# Patient Record
Sex: MALE | Race: WHITE | Employment: FULL TIME | URBAN - METROPOLITAN AREA
[De-identification: names, ages, dates, MRNs, and addresses within clinical notes are randomized per-mention and may not be internally consistent; named-entity substitution may affect disease eponyms.]

---

## 2023-02-16 ENCOUNTER — HOSPITAL ENCOUNTER (OUTPATIENT)
Facility: HOSPITAL | Age: 83
Discharge: HOME | End: 2023-02-16
Payer: MEDICARE

## 2023-02-16 DIAGNOSIS — M75.41 IMPINGEMENT SYNDROME OF RIGHT SHOULDER: ICD-10-CM

## 2023-02-16 LAB
ALBUMIN SERPL-MCNC: 3.5 G/DL (ref 3.4–5)
ALBUMIN/GLOB SERPL: 1.2 (ref 0.8–1.7)
ALP SERPL-CCNC: 92 U/L (ref 45–117)
ALT SERPL-CCNC: 19 U/L (ref 16–61)
ANION GAP SERPL CALC-SCNC: 4 MMOL/L (ref 3–18)
APPEARANCE UR: CLEAR
AST SERPL-CCNC: 13 U/L (ref 10–38)
BILIRUB SERPL-MCNC: 0.4 MG/DL (ref 0.2–1)
BILIRUB UR QL: NEGATIVE
BUN SERPL-MCNC: 19 MG/DL (ref 7–18)
BUN/CREAT SERPL: 23 (ref 12–20)
CALCIUM SERPL-MCNC: 9.6 MG/DL (ref 8.5–10.1)
CHLORIDE SERPL-SCNC: 105 MMOL/L (ref 100–111)
CO2 SERPL-SCNC: 31 MMOL/L (ref 21–32)
COLOR UR: YELLOW
CREAT SERPL-MCNC: 0.83 MG/DL (ref 0.6–1.3)
EKG ATRIAL RATE: 83 BPM
EKG DIAGNOSIS: NORMAL
EKG P AXIS: 70 DEGREES
EKG P-R INTERVAL: 194 MS
EKG Q-T INTERVAL: 390 MS
EKG QRS DURATION: 138 MS
EKG QTC CALCULATION (BAZETT): 458 MS
EKG R AXIS: 19 DEGREES
EKG T AXIS: 46 DEGREES
EKG VENTRICULAR RATE: 83 BPM
ERYTHROCYTE [DISTWIDTH] IN BLOOD BY AUTOMATED COUNT: 12.6 % (ref 11.6–14.5)
GLOBULIN SER CALC-MCNC: 3 G/DL (ref 2–4)
GLUCOSE SERPL-MCNC: 120 MG/DL (ref 74–99)
GLUCOSE UR STRIP.AUTO-MCNC: NEGATIVE MG/DL
HCT VFR BLD AUTO: 36.2 % (ref 36–48)
HGB BLD-MCNC: 12.3 G/DL (ref 13–16)
HGB UR QL STRIP: NEGATIVE
KETONES UR QL STRIP.AUTO: NEGATIVE MG/DL
LEUKOCYTE ESTERASE UR QL STRIP.AUTO: NEGATIVE
MCH RBC QN AUTO: 34.8 PG (ref 24–34)
MCHC RBC AUTO-ENTMCNC: 34 G/DL (ref 31–37)
MCV RBC AUTO: 102.5 FL (ref 78–100)
NITRITE UR QL STRIP.AUTO: NEGATIVE
NRBC # BLD: 0 K/UL (ref 0–0.01)
NRBC BLD-RTO: 0 PER 100 WBC
PH UR STRIP: 6.5 (ref 5–8)
PLATELET # BLD AUTO: 211 K/UL (ref 135–420)
PMV BLD AUTO: 8.9 FL (ref 9.2–11.8)
POTASSIUM SERPL-SCNC: 4.4 MMOL/L (ref 3.5–5.5)
PROT SERPL-MCNC: 6.5 G/DL (ref 6.4–8.2)
PROT UR STRIP-MCNC: NEGATIVE MG/DL
RBC # BLD AUTO: 3.53 M/UL (ref 4.35–5.65)
SODIUM SERPL-SCNC: 140 MMOL/L (ref 136–145)
SP GR UR REFRACTOMETRY: 1.02 (ref 1–1.03)
UROBILINOGEN UR QL STRIP.AUTO: 1 EU/DL (ref 0.2–1)
WBC # BLD AUTO: 8.4 K/UL (ref 4.6–13.2)

## 2023-02-16 PROCEDURE — 36415 COLL VENOUS BLD VENIPUNCTURE: CPT

## 2023-02-16 PROCEDURE — 81003 URINALYSIS AUTO W/O SCOPE: CPT

## 2023-02-16 PROCEDURE — 85027 COMPLETE CBC AUTOMATED: CPT

## 2023-02-16 PROCEDURE — 80053 COMPREHEN METABOLIC PANEL: CPT

## 2023-03-06 ENCOUNTER — ANESTHESIA EVENT (OUTPATIENT)
Facility: HOSPITAL | Age: 83
End: 2023-03-06
Payer: MEDICARE

## 2023-03-06 ENCOUNTER — HOSPITAL ENCOUNTER (OUTPATIENT)
Facility: HOSPITAL | Age: 83
Setting detail: OUTPATIENT SURGERY
Discharge: HOME | End: 2023-03-06
Attending: ORTHOPAEDIC SURGERY | Admitting: ORTHOPAEDIC SURGERY
Payer: MEDICARE

## 2023-03-06 ENCOUNTER — ANESTHESIA (OUTPATIENT)
Facility: HOSPITAL | Age: 83
End: 2023-03-06
Payer: MEDICARE

## 2023-03-06 VITALS
OXYGEN SATURATION: 96 % | SYSTOLIC BLOOD PRESSURE: 132 MMHG | HEART RATE: 77 BPM | HEIGHT: 73 IN | DIASTOLIC BLOOD PRESSURE: 58 MMHG | TEMPERATURE: 97.1 F | BODY MASS INDEX: 26.85 KG/M2 | RESPIRATION RATE: 16 BRPM | WEIGHT: 202.6 LBS

## 2023-03-06 DIAGNOSIS — M75.111 NONTRAUMATIC INCOMPLETE TEAR OF RIGHT ROTATOR CUFF: Chronic | ICD-10-CM

## 2023-03-06 DIAGNOSIS — M75.41 SHOULDER IMPINGEMENT SYNDROME, RIGHT: Primary | Chronic | ICD-10-CM

## 2023-03-06 DIAGNOSIS — M19.012 PRIMARY OSTEOARTHRITIS OF LEFT SHOULDER: Chronic | ICD-10-CM

## 2023-03-06 PROCEDURE — 3700000001 HC ADD 15 MINUTES (ANESTHESIA): Performed by: ORTHOPAEDIC SURGERY

## 2023-03-06 PROCEDURE — 6370000000 HC RX 637 (ALT 250 FOR IP): Performed by: SPECIALIST

## 2023-03-06 PROCEDURE — 6360000002 HC RX W HCPCS: Performed by: ORTHOPAEDIC SURGERY

## 2023-03-06 PROCEDURE — 7100000011 HC PHASE II RECOVERY - ADDTL 15 MIN: Performed by: ORTHOPAEDIC SURGERY

## 2023-03-06 PROCEDURE — 2500000003 HC RX 250 WO HCPCS: Performed by: ORTHOPAEDIC SURGERY

## 2023-03-06 PROCEDURE — 2500000003 HC RX 250 WO HCPCS: Performed by: NURSE ANESTHETIST, CERTIFIED REGISTERED

## 2023-03-06 PROCEDURE — 7100000001 HC PACU RECOVERY - ADDTL 15 MIN: Performed by: ORTHOPAEDIC SURGERY

## 2023-03-06 PROCEDURE — 2580000003 HC RX 258: Performed by: ORTHOPAEDIC SURGERY

## 2023-03-06 PROCEDURE — 3600000002 HC SURGERY LEVEL 2 BASE: Performed by: ORTHOPAEDIC SURGERY

## 2023-03-06 PROCEDURE — 7100000010 HC PHASE II RECOVERY - FIRST 15 MIN: Performed by: ORTHOPAEDIC SURGERY

## 2023-03-06 PROCEDURE — 2709999900 HC NON-CHARGEABLE SUPPLY: Performed by: ORTHOPAEDIC SURGERY

## 2023-03-06 PROCEDURE — 6360000002 HC RX W HCPCS: Performed by: NURSE ANESTHETIST, CERTIFIED REGISTERED

## 2023-03-06 PROCEDURE — C1713 ANCHOR/SCREW BN/BN,TIS/BN: HCPCS | Performed by: ORTHOPAEDIC SURGERY

## 2023-03-06 PROCEDURE — 3600000012 HC SURGERY LEVEL 2 ADDTL 15MIN: Performed by: ORTHOPAEDIC SURGERY

## 2023-03-06 PROCEDURE — 7100000000 HC PACU RECOVERY - FIRST 15 MIN: Performed by: ORTHOPAEDIC SURGERY

## 2023-03-06 PROCEDURE — 3700000000 HC ANESTHESIA ATTENDED CARE: Performed by: ORTHOPAEDIC SURGERY

## 2023-03-06 RX ORDER — FENTANYL CITRATE 50 UG/ML
25 INJECTION, SOLUTION INTRAMUSCULAR; INTRAVENOUS EVERY 5 MIN PRN
Status: DISCONTINUED | OUTPATIENT
Start: 2023-03-06 | End: 2023-03-06 | Stop reason: HOSPADM

## 2023-03-06 RX ORDER — MORPHINE SULFATE 4 MG/ML
INJECTION, SOLUTION INTRAMUSCULAR; INTRAVENOUS PRN
Status: DISCONTINUED | OUTPATIENT
Start: 2023-03-06 | End: 2023-03-06 | Stop reason: ALTCHOICE

## 2023-03-06 RX ORDER — TRAMADOL HYDROCHLORIDE 50 MG/1
50 TABLET ORAL EVERY 6 HOURS PRN
Status: DISCONTINUED | OUTPATIENT
Start: 2023-03-06 | End: 2023-03-06 | Stop reason: HOSPADM

## 2023-03-06 RX ORDER — CELECOXIB 200 MG/1
CAPSULE ORAL DAILY
COMMUNITY
Start: 2023-01-05

## 2023-03-06 RX ORDER — SCOLOPAMINE TRANSDERMAL SYSTEM 1 MG/1
1 PATCH, EXTENDED RELEASE TRANSDERMAL ONCE
Status: DISCONTINUED | OUTPATIENT
Start: 2023-03-06 | End: 2023-03-06 | Stop reason: HOSPADM

## 2023-03-06 RX ORDER — SODIUM CHLORIDE, SODIUM LACTATE, POTASSIUM CHLORIDE, CALCIUM CHLORIDE 600; 310; 30; 20 MG/100ML; MG/100ML; MG/100ML; MG/100ML
INJECTION, SOLUTION INTRAVENOUS CONTINUOUS
Status: DISCONTINUED | OUTPATIENT
Start: 2023-03-06 | End: 2023-03-06 | Stop reason: HOSPADM

## 2023-03-06 RX ORDER — SENNOSIDES 8.6 MG
650 CAPSULE ORAL EVERY 8 HOURS PRN
COMMUNITY

## 2023-03-06 RX ORDER — ONDANSETRON 2 MG/ML
4 INJECTION INTRAMUSCULAR; INTRAVENOUS EVERY 6 HOURS PRN
Status: CANCELLED | OUTPATIENT
Start: 2023-03-06

## 2023-03-06 RX ORDER — MIDAZOLAM HYDROCHLORIDE 2 MG/2ML
2 INJECTION, SOLUTION INTRAMUSCULAR; INTRAVENOUS
Status: DISCONTINUED | OUTPATIENT
Start: 2023-03-06 | End: 2023-03-06 | Stop reason: HOSPADM

## 2023-03-06 RX ORDER — LIDOCAINE HYDROCHLORIDE 20 MG/ML
INJECTION, SOLUTION EPIDURAL; INFILTRATION; INTRACAUDAL; PERINEURAL PRN
Status: DISCONTINUED | OUTPATIENT
Start: 2023-03-06 | End: 2023-03-06 | Stop reason: SDUPTHER

## 2023-03-06 RX ORDER — FLUTICASONE PROPIONATE 50 MCG
2 SPRAY, SUSPENSION (ML) NASAL DAILY
COMMUNITY
Start: 2023-02-07 | End: 2023-03-09

## 2023-03-06 RX ORDER — ONDANSETRON 2 MG/ML
4 INJECTION INTRAMUSCULAR; INTRAVENOUS
Status: DISCONTINUED | OUTPATIENT
Start: 2023-03-06 | End: 2023-03-06 | Stop reason: HOSPADM

## 2023-03-06 RX ORDER — PHENYLEPHRINE HCL IN 0.9% NACL 1 MG/10 ML
SYRINGE (ML) INTRAVENOUS PRN
Status: DISCONTINUED | OUTPATIENT
Start: 2023-03-06 | End: 2023-03-06 | Stop reason: SDUPTHER

## 2023-03-06 RX ORDER — HYDROMORPHONE HYDROCHLORIDE 1 MG/ML
0.5 INJECTION, SOLUTION INTRAMUSCULAR; INTRAVENOUS; SUBCUTANEOUS EVERY 5 MIN PRN
Status: DISCONTINUED | OUTPATIENT
Start: 2023-03-06 | End: 2023-03-06 | Stop reason: HOSPADM

## 2023-03-06 RX ORDER — MORPHINE SULFATE 4 MG/ML
2 INJECTION, SOLUTION INTRAMUSCULAR; INTRAVENOUS
Status: CANCELLED | OUTPATIENT
Start: 2023-03-06

## 2023-03-06 RX ORDER — SIMVASTATIN 40 MG
TABLET ORAL
COMMUNITY
Start: 2022-09-26

## 2023-03-06 RX ORDER — MORPHINE SULFATE 4 MG/ML
4 INJECTION, SOLUTION INTRAMUSCULAR; INTRAVENOUS
Status: CANCELLED | OUTPATIENT
Start: 2023-03-06

## 2023-03-06 RX ORDER — ASPIRIN 81 MG/1
81 TABLET ORAL DAILY
COMMUNITY
Start: 2020-07-01

## 2023-03-06 RX ORDER — HYDROCODONE BITARTRATE AND ACETAMINOPHEN 5; 325 MG/1; MG/1
1 TABLET ORAL EVERY 4 HOURS PRN
Qty: 18 TABLET | Refills: 0 | Status: SHIPPED | OUTPATIENT
Start: 2023-03-06 | End: 2023-03-13

## 2023-03-06 RX ORDER — BUPIVACAINE HYDROCHLORIDE AND EPINEPHRINE 5; 5 MG/ML; UG/ML
INJECTION, SOLUTION EPIDURAL; INTRACAUDAL; PERINEURAL PRN
Status: DISCONTINUED | OUTPATIENT
Start: 2023-03-06 | End: 2023-03-06 | Stop reason: ALTCHOICE

## 2023-03-06 RX ORDER — METOPROLOL SUCCINATE 25 MG/1
25 TABLET, EXTENDED RELEASE ORAL ONCE
Status: COMPLETED | OUTPATIENT
Start: 2023-03-06 | End: 2023-03-06

## 2023-03-06 RX ORDER — SODIUM CHLORIDE 0.9 % (FLUSH) 0.9 %
5-40 SYRINGE (ML) INJECTION EVERY 12 HOURS SCHEDULED
Status: DISCONTINUED | OUTPATIENT
Start: 2023-03-06 | End: 2023-03-06 | Stop reason: HOSPADM

## 2023-03-06 RX ORDER — ACETAMINOPHEN 325 MG/1
650 TABLET ORAL
Status: DISCONTINUED | OUTPATIENT
Start: 2023-03-06 | End: 2023-03-06 | Stop reason: HOSPADM

## 2023-03-06 RX ORDER — SODIUM CHLORIDE 0.9 % (FLUSH) 0.9 %
5-40 SYRINGE (ML) INJECTION PRN
Status: DISCONTINUED | OUTPATIENT
Start: 2023-03-06 | End: 2023-03-06 | Stop reason: HOSPADM

## 2023-03-06 RX ORDER — DIPHENHYDRAMINE HYDROCHLORIDE 50 MG/ML
12.5 INJECTION INTRAMUSCULAR; INTRAVENOUS
Status: DISCONTINUED | OUTPATIENT
Start: 2023-03-06 | End: 2023-03-06 | Stop reason: HOSPADM

## 2023-03-06 RX ORDER — LABETALOL HYDROCHLORIDE 5 MG/ML
5 INJECTION, SOLUTION INTRAVENOUS
Status: DISCONTINUED | OUTPATIENT
Start: 2023-03-06 | End: 2023-03-06 | Stop reason: HOSPADM

## 2023-03-06 RX ORDER — SODIUM CHLORIDE 9 MG/ML
INJECTION, SOLUTION INTRAVENOUS PRN
Status: DISCONTINUED | OUTPATIENT
Start: 2023-03-06 | End: 2023-03-06 | Stop reason: HOSPADM

## 2023-03-06 RX ORDER — FERROUS SULFATE 325(65) MG
325 TABLET ORAL
COMMUNITY

## 2023-03-06 RX ORDER — ACETAMINOPHEN 325 MG/1
650 TABLET ORAL EVERY 4 HOURS PRN
Status: CANCELLED | OUTPATIENT
Start: 2023-03-06

## 2023-03-06 RX ORDER — OXYCODONE HYDROCHLORIDE 5 MG/1
5 TABLET ORAL ONCE
Status: COMPLETED | OUTPATIENT
Start: 2023-03-06 | End: 2023-03-06

## 2023-03-06 RX ORDER — ONDANSETRON 2 MG/ML
INJECTION INTRAMUSCULAR; INTRAVENOUS PRN
Status: DISCONTINUED | OUTPATIENT
Start: 2023-03-06 | End: 2023-03-06 | Stop reason: SDUPTHER

## 2023-03-06 RX ORDER — HYDROMORPHONE HCL 110MG/55ML
PATIENT CONTROLLED ANALGESIA SYRINGE INTRAVENOUS PRN
Status: DISCONTINUED | OUTPATIENT
Start: 2023-03-06 | End: 2023-03-06 | Stop reason: SDUPTHER

## 2023-03-06 RX ORDER — DEXAMETHASONE SODIUM PHOSPHATE 4 MG/ML
INJECTION, SOLUTION INTRA-ARTICULAR; INTRALESIONAL; INTRAMUSCULAR; INTRAVENOUS; SOFT TISSUE PRN
Status: DISCONTINUED | OUTPATIENT
Start: 2023-03-06 | End: 2023-03-06 | Stop reason: SDUPTHER

## 2023-03-06 RX ORDER — METOPROLOL SUCCINATE 25 MG/1
TABLET, EXTENDED RELEASE ORAL
COMMUNITY
Start: 2023-01-18

## 2023-03-06 RX ADMIN — ONDANSETRON HYDROCHLORIDE 4 MG: 2 INJECTION INTRAMUSCULAR; INTRAVENOUS at 07:43

## 2023-03-06 RX ADMIN — Medication 100 MCG: at 07:53

## 2023-03-06 RX ADMIN — METOPROLOL SUCCINATE 25 MG: 25 TABLET, EXTENDED RELEASE ORAL at 06:33

## 2023-03-06 RX ADMIN — Medication 100 MCG: at 07:50

## 2023-03-06 RX ADMIN — SODIUM CHLORIDE, POTASSIUM CHLORIDE, SODIUM LACTATE AND CALCIUM CHLORIDE: 600; 310; 30; 20 INJECTION, SOLUTION INTRAVENOUS at 06:22

## 2023-03-06 RX ADMIN — OXYCODONE 5 MG: 5 TABLET ORAL at 09:33

## 2023-03-06 RX ADMIN — Medication 2 G: at 07:38

## 2023-03-06 RX ADMIN — HYDROMORPHONE HYDROCHLORIDE 1 MG: 2 INJECTION, SOLUTION INTRAMUSCULAR; INTRAVENOUS; SUBCUTANEOUS at 07:39

## 2023-03-06 RX ADMIN — LIDOCAINE HYDROCHLORIDE 100 MG: 20 INJECTION, SOLUTION EPIDURAL; INFILTRATION; INTRACAUDAL; PERINEURAL at 07:31

## 2023-03-06 RX ADMIN — DEXAMETHASONE SODIUM PHOSPHATE 4 MG: 4 INJECTION, SOLUTION INTRAMUSCULAR; INTRAVENOUS at 07:43

## 2023-03-06 ASSESSMENT — PAIN DESCRIPTION - LOCATION
LOCATION: SHOULDER

## 2023-03-06 ASSESSMENT — PAIN SCALES - GENERAL
PAINLEVEL_OUTOF10: 3
PAINLEVEL_OUTOF10: 3
PAINLEVEL_OUTOF10: 0
PAINLEVEL_OUTOF10: 3

## 2023-03-06 ASSESSMENT — PAIN DESCRIPTION - DESCRIPTORS
DESCRIPTORS: ACHING

## 2023-03-06 ASSESSMENT — PAIN DESCRIPTION - ORIENTATION
ORIENTATION: RIGHT
ORIENTATION: RIGHT

## 2023-03-06 ASSESSMENT — PAIN - FUNCTIONAL ASSESSMENT: PAIN_FUNCTIONAL_ASSESSMENT: 0-10

## 2023-03-06 NOTE — PERIOP NOTE
TRANSFER - IN REPORT:    Verbal report received from CHEMA Lincoln RN on [employer]  being received from PACU for routine post-op      Report consisted of patient's Situation, Background, Assessment and   Recommendations(SBAR). Information from the following report(s) Nurse Handoff Report, Surgery Report, Intake/Output, and MAR was reviewed with the receiving nurse. Opportunity for questions and clarification was provided. Assessment completed upon patient's arrival to unit and care assumed.

## 2023-03-06 NOTE — ANESTHESIA PRE PROCEDURE
Department of Anesthesiology  Preprocedure Note       Name:  Pal Sage   Age:  82 y.o.  :  1940                                          MRN:  [id number]         Date:  3/6/2023      Surgeon: Sara Blood):  Larry Sousa MD    Procedure: Procedure(s):  SURGICAL ARTHROSCOPY RIGHT SHOULDER WITH DISTAL CLAVICLE EXICISION/ACROMIOPLASTY    Medications prior to admission:   Prior to Admission medications    Medication Sig Start Date End Date Taking?  Authorizing Provider   aspirin 81 MG EC tablet Take 81 mg by mouth daily 20  Yes Historical Provider, MD   metoprolol succinate (TOPROL XL) 25 MG extended release tablet TAKE 1 TABLET(25 MG) BY MOUTH DAILY 23  Yes Historical Provider, MD   celecoxib (CELEBREX) 200 MG capsule Take by mouth daily 23  Yes Historical Provider, MD   fluticasone (FLONASE) 50 MCG/ACT nasal spray 2 sprays by Nasal route daily 2/7/23 3/9/23 Yes Historical Provider, MD   simvastatin (ZOCOR) 40 MG tablet TAKE 1 TABLET(40 MG) BY MOUTH AT BEDTIME 22  Yes Historical Provider, MD   acetaminophen (TYLENOL 8 HOUR ARTHRITIS PAIN) 650 MG extended release tablet Take 650 mg by mouth every 8 hours as needed for Pain   Yes Historical Provider, MD   ferrous sulfate (IRON 325) 325 (65 Fe) MG tablet Take 325 mg by mouth daily (with breakfast)   Yes Historical Provider, MD       Current medications:    Current Facility-Administered Medications   Medication Dose Route Frequency Provider Last Rate Last Admin    lactated ringers IV soln infusion   IntraVENous Continuous Larry Sousa  mL/hr at 23 0622 New Bag at 23 0622    ceFAZolin (ANCEF) 2000 mg in sterile water 20 mL IV syringe  2,000 mg IntraVENous Q8H Larry Sousa MD        scopolamine (TRANSDERM-SCOP) transdermal patch 1 patch  1 patch TransDERmal Once Mary Palacios MD   1 patch at 23 0631     Facility-Administered Medications Ordered in Other Encounters   Medication Dose Route Frequency Provider Last Rate Last Admin    lactated ringers IV soln infusion   IntraVENous Continuous Mari Colorado MD           Allergies:  No Known Allergies    Problem List:  There is no problem list on file for this patient. Past Medical History:        Diagnosis Date    CAD (coronary artery disease)     Hypertension     PONV (postoperative nausea and vomiting)     nausea       Past Surgical History:        Procedure Laterality Date    CARDIAC SURGERY  2001    CABG    CATARACT EXTRACTION Bilateral 2000       Social History:    Social History     Tobacco Use    Smoking status: Never    Smokeless tobacco: Never   Substance Use Topics    Alcohol use: Not Currently                                Counseling given: Not Answered      Vital Signs (Current):   Vitals:    03/06/23 0546 03/06/23 0622   BP: (!) 190/96 (!) 156/82   Pulse: 78 79   Resp: 16    Temp: 97.1 °F (36.2 °C)    TempSrc: Temporal    SpO2: 100%    Weight: 202 lb 9.6 oz (91.9 kg)    Height: 6' 1\" (1.854 m)                                               BP Readings from Last 3 Encounters:   03/06/23 (!) 156/82       NPO Status: Time of last liquid consumption: 2000                        Time of last solid consumption: 2000                        Date of last liquid consumption: 03/05/23                        Date of last solid food consumption: 03/05/23    BMI:   Wt Readings from Last 3 Encounters:   03/06/23 202 lb 9.6 oz (91.9 kg)   02/20/23 195 lb (88.5 kg)     Body mass index is 26.73 kg/m².     CBC:   Lab Results   Component Value Date/Time    WBC 8.4 02/16/2023 09:06 AM    RBC 3.53 02/16/2023 09:06 AM    HGB 12.3 02/16/2023 09:06 AM    HCT 36.2 02/16/2023 09:06 AM    .5 02/16/2023 09:06 AM    RDW 12.6 02/16/2023 09:06 AM     02/16/2023 09:06 AM       CMP:   Lab Results   Component Value Date/Time     02/16/2023 09:06 AM    K 4.4 02/16/2023 09:06 AM     02/16/2023 09:06 AM    CO2 31 02/16/2023 09:06 AM    BUN 19 02/16/2023 09:06 AM CREATININE 0.83 02/16/2023 09:06 AM    LABGLOM >60 02/16/2023 09:06 AM    GLUCOSE 120 02/16/2023 09:06 AM    PROT 6.5 02/16/2023 09:06 AM    CALCIUM 9.6 02/16/2023 09:06 AM    BILITOT 0.4 02/16/2023 09:06 AM    ALKPHOS 92 02/16/2023 09:06 AM    AST 13 02/16/2023 09:06 AM    ALT 19 02/16/2023 09:06 AM       POC Tests: No results for input(s): POCGLU, POCNA, POCK, POCCL, POCBUN, POCHEMO, POCHCT in the last 72 hours. Coags: No results found for: PROTIME, INR, APTT    HCG (If Applicable): No results found for: PREGTESTUR, PREGSERUM, HCG, HCGQUANT     ABGs: No results found for: PHART, PO2ART, GXL3WTL, FNW3GFD, BEART, L9YUCKLM     Type & Screen (If Applicable):  No results found for: LABABO, LABRH    Drug/Infectious Status (If Applicable):  No results found for: HIV, HEPCAB    COVID-19 Screening (If Applicable): No results found for: COVID19        Anesthesia Evaluation  Patient summary reviewed and Nursing notes reviewed   history of anesthetic complications: PONV. Airway: Mallampati: II  TM distance: >3 FB   Neck ROM: full  Mouth opening: < 3 FB   Dental:    (+) bridge and caps      Pulmonary:Negative Pulmonary ROS and normal exam                               Cardiovascular:    (+) hypertension:,     (-) CAD        Rate: abnormal           Beta Blocker:  Dose within 24 Hrs         Neuro/Psych:   Negative Neuro/Psych ROS              GI/Hepatic/Renal: Neg GI/Hepatic/Renal ROS            Endo/Other: Negative Endo/Other ROS                    Abdominal:             Vascular: negative vascular ROS. Other Findings:           Anesthesia Plan      general     ASA 2       Induction: intravenous. MIPS: Prophylactic antiemetics administered. Anesthetic plan and risks discussed with patient.         Attending anesthesiologist reviewed and agrees with Stoney Townsend MD   3/6/2023

## 2023-03-06 NOTE — H&P
History and Physical        Patient: Joanne Navas               Sex: male          DOA: 3/6/2023         YOB: 1940      Age:  82 y.o.        LOS:  LOS: 0 days        HPI:     Joanne Navas is a 82 y.o. male who has right shoulder pain despite non-op therapy. MRI PRCT, impingment, ac DJD    Past Medical History:   Diagnosis Date    CAD (coronary artery disease)     Hypertension     PONV (postoperative nausea and vomiting)     nausea       Past Surgical History:   Procedure Laterality Date    CARDIAC SURGERY  2001    CABG    CATARACT EXTRACTION Bilateral 2000       History reviewed. No pertinent family history. Social History     Socioeconomic History    Marital status:      Spouse name: None    Number of children: None    Years of education: None    Highest education level: None   Tobacco Use    Smoking status: Never    Smokeless tobacco: Never   Vaping Use    Vaping Use: Never used   Substance and Sexual Activity    Alcohol use: Not Currently    Drug use: Never    Sexual activity: Not Currently       Prior to Admission medications    Medication Sig Start Date End Date Taking?  Authorizing Provider   aspirin 81 MG EC tablet Take 81 mg by mouth daily 7/1/20  Yes Historical Provider, MD   metoprolol succinate (TOPROL XL) 25 MG extended release tablet TAKE 1 TABLET(25 MG) BY MOUTH DAILY 1/18/23  Yes Historical Provider, MD   celecoxib (CELEBREX) 200 MG capsule Take by mouth daily 1/5/23  Yes Historical Provider, MD   fluticasone (FLONASE) 50 MCG/ACT nasal spray 2 sprays by Nasal route daily 2/7/23 3/9/23 Yes Historical Provider, MD   simvastatin (ZOCOR) 40 MG tablet TAKE 1 TABLET(40 MG) BY MOUTH AT BEDTIME 9/26/22  Yes Historical Provider, MD   acetaminophen (TYLENOL 8 HOUR ARTHRITIS PAIN) 650 MG extended release tablet Take 650 mg by mouth every 8 hours as needed for Pain   Yes Historical Provider, MD   ferrous sulfate (IRON 325) 325 (65 Fe) MG tablet Take 325 mg by mouth daily (with breakfast) Yes Historical Provider, MD       No Known Allergies    Review of Systems  A comprehensive review of systems was negative except for that written in the History of Present Illness. Physical Exam:      Visit Vitals  BP (!) 156/82   Pulse 79   Temp 97.1 °F (36.2 °C) (Temporal)   Resp 16   Ht 6' 1\" (1.854 m)   Wt 202 lb 9.6 oz (91.9 kg)   SpO2 100%   BMI 26.73 kg/m²       Physical Exam:  Physical Exam:   General:  Alert, cooperative, no distress, appears stated age. Eyes:  Conjunctivae/corneas clear. PERRL, EOMs intact. Fundi benign   Ears:  Normal TMs and external ear canals both ears. Nose: Nares normal. Septum midline. Mucosa normal. No drainage or sinus tenderness. Mouth/Throat: Lips, mucosa, and tongue normal. Teeth and gums normal.   Neck: Supple, symmetrical, trachea midline, no adenopathy, thyroid: no enlargement/tenderness/nodules, no carotid bruit and no JVD. Back:   Symmetric, no curvature. ROM normal. No CVA tenderness. Lungs:   Clear to auscultation bilaterally. Heart:  Regular rate and rhythm, S1, S2 normal, no murmur, click, rub or gallop. Abdomen:   Soft, non-tender. Bowel sounds normal. No masses,  No organomegaly. Extremities: Extremities normal, atraumatic, no cyanosis or edema. Pulses: 2+ and symmetric all extremities. Skin: Skin color, texture, turgor normal. No rashes or lesions   Lymph nodes: Cervical, supraclavicular, and axillary nodes normal.   Neurologic: CNII-XII intact. Normal strength, sensation and reflexes throughout. Labs Reviewed:        Assessment/Plan     Active Problems:    * No active hospital problems. *  Resolved Problems:    * No resolved hospital problems.  *      Right shoulder arthrscopy, acromioplasty , DCE, suprascapular nerve block

## 2023-03-06 NOTE — PROGRESS NOTES
TRANSFER - OUT REPORT:    Verbal report given to Robb Luong RN on [facility]  being transferred to Phase II for routine progression of patient care       Report consisted of patient's Situation, Background, Assessment and   Recommendations(SBAR). Information from the following report(s) Nurse Handoff Report, Adult Overview, Surgery Report, Intake/Output, MAR, and Cardiac Rhythm SR  was reviewed with the receiving nurse. Olin Assessment: No data recorded  Lines:   Peripheral IV 03/06/23 Left;Posterior Wrist (Active)   Site Assessment Clean, dry & intact 03/06/23 0836   Line Status Infusing 03/06/23 0836   Phlebitis Assessment No symptoms 03/06/23 0836   Infiltration Assessment 0 03/06/23 0836   Alcohol Cap Used No 03/06/23 0836   Dressing Status Clean, dry & intact 03/06/23 0836   Dressing Type Transparent 03/06/23 0836        Opportunity for questions and clarification was provided.       Patient transported with:  [employer]

## 2023-03-06 NOTE — ANESTHESIA POSTPROCEDURE EVALUATION
Department of Anesthesiology  Postprocedure Note    Patient: Lonnie Méndez  MRN: [id number]  YOB: 1940  Date of evaluation: 3/6/2023      Procedure Summary     Date: 03/06/23 Room / Location: [facility] MAIN 06 / [facility] MAIN OR    Anesthesia Start: 0727 Anesthesia Stop: 0838    Procedure: SURGICAL ARTHROSCOPY RIGHT SHOULDER WITH DISTAL CLAVICLE EXICISION/ACROMIOPLASTY (Right: Shoulder) Diagnosis:       Impingement syndrome of right shoulder      Osteoarthritis of right shoulder, unspecified osteoarthritis type      (RIGHT SHOULDER IMPINGEMENT,RIGHT SHOULDER OSTEOARTHRITIS)    Surgeons: Toño Colorado MD Responsible Provider: Toño Mcclain MD    Anesthesia Type: general ASA Status: 2          Anesthesia Type: No value filed.    Payal Phase I: Payal Score: 10    Payal Phase II:        Anesthesia Post Evaluation    Patient location during evaluation: PACU  Level of consciousness: awake  Pain score: 0  Airway patency: patent  Nausea & Vomiting: no nausea and no vomiting  Complications: no  Cardiovascular status: hemodynamically stable  Respiratory status: acceptable  Hydration status: stable

## 2023-03-06 NOTE — DISCHARGE INSTRUCTIONS
Resume pre hospital diet  Drink plenty of fluids  Ambulate in house  Take prescription as directed  You may discontinue the [facility] Tuesday morning  Okay to shower Tuesday morning  Follow up with Dr. Payal Bernstein in 1 week        DISCHARGE SUMMARY from Nurse    PATIENT INSTRUCTIONS:    After general anesthesia or intravenous sedation, for 24 hours or while taking prescription Narcotics:  Limit your activities  Do not drive and operate hazardous machinery  Do not make important personal or business decisions  Do  not drink alcoholic beverages  If you have not urinated within 8 hours after discharge, please contact your surgeon on call. Report the following to your surgeon:  Excessive pain, swelling, redness or odor of or around the surgical area  Temperature over 100.5  Nausea and vomiting lasting longer than 4 hours or if unable to take medications  Any signs of decreased circulation or nerve impairment to extremity: change in color, persistent  numbness, tingling, coldness or increase pain  Any questions    What to do at Home:  Recommended activity: ambulate in house and no lifting, Driving, or Strenuous exercise until advised    If you experience any of the following symptoms fever, chills, uncontrollable pain , active bleeding or drainage , circulation changes ( cold, numb, blue extremities) , nausea, vomiting, please follow up with DR. Payal Bernstein. *  Please give a list of your current medications to your Primary Care Provider. *  Please update this list whenever your medications are discontinued, doses are      changed, or new medications (including over-the-counter products) are added. *  Please carry medication information at all times in case of emergency situations. These are general instructions for a healthy lifestyle:    No smoking/ No tobacco products/ Avoid exposure to second hand smoke  Surgeon General's Warning:  Quitting smoking now greatly reduces serious risk to your health.     Obesity, smoking, and sedentary lifestyle greatly increases your risk for illness    A healthy diet, regular physical exercise & weight monitoring are important for maintaining a healthy lifestyle    You may be retaining fluid if you have a history of heart failure or if you experience any of the following symptoms:  Weight gain of 3 pounds or more overnight or 5 pounds in a week, increased swelling in our hands or feet or shortness of breath while lying flat in bed. Please call your doctor as soon as you notice any of these symptoms; do not wait until your next office visit. Patient armband removed and shredded     The discharge information has been reviewed with the patient and caregiver. The patient and caregiver verbalized understanding. Discharge medications reviewed with the patient and caregiver and appropriate educational materials and side effects teaching were provided.   ___________________________________________________________________________________________________________________________________

## 2023-03-06 NOTE — PERIOP NOTE
Reviewed PTA medication list with patient/caregiver and patient/caregiver denies any additional medications. Patient admits to having a responsible adult care for them at home for at least 24 hours after surgery. Patient encouraged to use gown warming system and informed that using said warming gown to regulate body temperature prior to a procedure has been shown to help reduce the risks of blood clots and infection. Patient's pharmacy of choice verified and documented in PTA medication section. Dual skin assessment & fall risk band verification completed with [facility].

## 2023-03-06 NOTE — OP NOTE
[facility] [place]  OPERATIVE REPORT    Name:  Nora Esparza  MR#:   [id number]  :  1940  ACCOUNT #:  [de-identified]  DATE OF SERVICE:  2023    PREOPERATIVE DIAGNOSES:  Right shoulder partial rotator cuff tear, impingement, acromioclavicular joint arthrosis. POSTOPERATIVE DIAGNOSES:  Right shoulder partial rotator cuff tear, impingement, acromioclavicular joint arthrosis. PROCEDURE PERFORMED:  Right shoulder arthroscopic acromioplasty, debridement, distal clavicle excision; suprascapular nerve block. SURGEON:  Barbara Lerma MD    ASSISTANT:  None. ANESTHESIA:  General.    ANESTHESIOLOGIST:  Dr. Lorna Parr. COMPLICATIONS:  None. SPECIMENS REMOVED:  None. IMPLANTS:  None. ESTIMATED BLOOD LOSS:  Minimal.    INDICATIONS:  An 53-year-old male, symptomatic right shoulder pain. MRI with impingement, AC joint arthrosis, partial rotator cuff tear. PROCEDURE:  The patient was brought to the operating room after receiving antibiotics. General anesthesia was administered. He was placed in a beach-chair position. Right shoulder was manipulated to full range of motion. There was some mild release of adhesions with full overhead motion. Lidocaine with epi was placed in the shoulder. The scope was placed into the shoulder. Cartilage was intact. There was numerous areas over the anterior labrum around the subscapularis and part of the biceps that had areas of calcific tendinosis. This was debrided with the ArthroWand at a low-power setting. Finally, the rotator cuff was debrided mildly from the undersurface and near the insertion. I did not see a complete through-and-through tear. From the subacromial side, the ArthroWand was used to clear out the bursa, so we could visualize the acromion and distal clavicle joint.   A bur was then used to remove the anterolateral 9 mm of the acromion, converting a type 2 to type 1 acromion from the anterior portal, a distal 10 mm of the clavicle was resected. This completed the operation. Instruments were removed from the shoulder. Portal sites were closed with nylon suture. 30 mL of Marcaine and 5 mg morphine was then used perform a suprascapular nerve block. Light compressive dressings were applied. A sling was applied. The patient was awoken and went to Recovery in stable condition.         Samantha Ramos MD      [id number]  D:  03/06/2023 8:40  T:  03/06/2023 10:41  JOB #:  [id number]

## (undated) DEVICE — WAND ABLAT DIA3.5MM 90DEG SUCT TURBOVAC 90

## (undated) DEVICE — GLOVE SURG SZ 7 CRM LTX FREE POLYISOPRENE POLYMER BEAD ANTI

## (undated) DEVICE — TUBE IRRIG L8IN LNG PT W/ CONN FOR PMP SYS REDEUCE

## (undated) DEVICE — GARMENT,MEDLINE,DVT,INT,CALF,MED, GEN2: Brand: MEDLINE

## (undated) DEVICE — 4.5 MM INCISOR PLUS STRAIGHT                                    BLADES, POWER/EP-1, VIOLET, PACKAGED                                    6 PER BOX, STERILE

## (undated) DEVICE — APPLICATOR MEDICATED 26 CC SOLUTION HI LT ORNG CHLORAPREP

## (undated) DEVICE — PACK PROCEDURE SURG KNEE ARTHSCP CUST

## (undated) DEVICE — 4.5 MM HELICUT STRAIGHT BURRS,                                    POWER/EP-1, SLATE, 5000 MAXIMUM RPM,                                    PACKAGED 6 PER BOX, STERILE: Brand: DYONICS HELICUT

## (undated) DEVICE — GLOVE ORANGE PI 8   MSG9080

## (undated) DEVICE — KIT PT CARE SCHLEIN ULT SHLDR POS

## (undated) DEVICE — SUTURE ETHLN SZ 3-0 L18IN NONABSORBABLE BLK L19MM PC-5 3/8 1893G

## (undated) DEVICE — GLOVE SURG SZ 7 L12IN FNGR THK79MIL GRN LTX FREE

## (undated) DEVICE — SOLUTION IRRIG 3000ML LAC R FLX CONT

## (undated) DEVICE — GOWN,SIRUS,NONRNF,SETINSLV,2XL,18/CS: Brand: MEDLINE

## (undated) DEVICE — STRAP,POSITIONING,KNEE/BODY,FOAM,4X60": Brand: MEDLINE

## (undated) DEVICE — DRAPE,SHOULDER,BEACH CHAIR,STERILE: Brand: MEDLINE

## (undated) DEVICE — 3M™ MICROFOAM™ TAPE 1528-4: Brand: 3M™ MICROFOAM™

## (undated) DEVICE — 4-PORT MANIFOLD: Brand: NEPTUNE 2